# Patient Record
Sex: FEMALE | Race: WHITE | Employment: FULL TIME | URBAN - METROPOLITAN AREA
[De-identification: names, ages, dates, MRNs, and addresses within clinical notes are randomized per-mention and may not be internally consistent; named-entity substitution may affect disease eponyms.]

---
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
## 2020-04-10 ENCOUNTER — OFFICE VISIT (OUTPATIENT)
Dept: NEUROLOGY | Age: 20
End: 2020-04-10

## 2020-04-10 VITALS
HEIGHT: 63 IN | WEIGHT: 127 LBS | DIASTOLIC BLOOD PRESSURE: 72 MMHG | SYSTOLIC BLOOD PRESSURE: 102 MMHG | HEART RATE: 81 BPM | TEMPERATURE: 98 F | BODY MASS INDEX: 22.5 KG/M2 | OXYGEN SATURATION: 98 %

## 2020-04-10 DIAGNOSIS — R20.2 TINGLING SENSATION IN FACE: Primary | ICD-10-CM

## 2020-04-10 DIAGNOSIS — H93.12 TINNITUS, LEFT: ICD-10-CM

## 2020-04-10 RX ORDER — CITALOPRAM 10 MG/1
1 TABLET ORAL DAILY
COMMUNITY
Start: 2020-03-03

## 2020-04-10 RX ORDER — LEVONORGESTREL 19.5 MG/1
INTRAUTERINE DEVICE INTRAUTERINE
COMMUNITY
Start: 2019-06-20

## 2020-04-10 NOTE — PROGRESS NOTES
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 who presents to discuss Migraine    Describes that she started having episodes in high school around 10 yo where she would wake up in the morning, and would have a specific feeling like something was coming on, felt nauseated, would generally eat something, frequently vomit, then would sleep for about 2 hours, and wake up feeling fine. None of the episodes were associated with headache. Episodes never happened when she was anxious. Stopped having the stomach episodes when she was around 22 yo. Started getting monthly migraine (moderate throbbing pain over left eyebrow becoming whole left side head throbbing, infrequent nausea/ rare vomiting, significant light and sound sensitivity, lasting 12-24+ hours) headaches (near menstrual periods) while she was on oral contraceptives. Swtched from [facility] to IUD 10 months ago. Stopped having migraine with her periods. Pt says she started taking Citalopram 10 mg/ day in Aug 2019 for anxiety. Around that time (6-8 months ago), she started having episodes of pins-needles feeling on left side of scalp, a/w transient reduction in hearing on left side followed by ringing in that ear for 1-2 minutes then normal hearing, vision feels mildly blurred, associated with some moderate throbbing pain over left eyebrow. Episodes occur 5-6 times a week or even multiple times a day. No change in alertness during these episodes. No personal or FHx of seizure that she's aware of. Has not had any CT Head or MRI Brain or EEG pertaining to this issue.      Complete Review of Systems: + anxiety, fatigue, frequent headaches (see above), tinnitus, visual disturbance; otherwise as noted in HPI     PMHx:  Past Medical History:   Diagnosis Date    Anxiety disorder     Headache         PSH:  Past Surgical History:   Procedure Laterality Date    HX HEENT      tonsils       FHx:  family history includes Migraines in her maternal grandmother, mother, and sister. SHx:  reports that she has never smoked. She has never used smokeless tobacco.    Allergies:   No Known Allergies    Meds:     Current Outpatient Medications   Medication Sig    citalopram (CELEXA) 10 mg tablet Take 1 Tab by mouth daily.  levonorgestreL (Kyleena) 17.5 mcg/24 hrs (5 yrs) 19.5 mg IUD IUD Kyleena 17.5 mcg/24 hrs (5yrs) 19.5mg intrauterine device   Take by intrauterine route.  HYDROXYZINE HCL PO Take  by mouth.  levonorgestrel-ethinyl estradiol (AUBRA) 0.1-20 mg-mcg tab Take 1 Tab by mouth daily.  ondansetron (ZOFRAN ODT) 4 mg disintegrating tablet Take 4 mg by mouth every eight (8) hours as needed for Nausea.  HYDROcodone-acetaminophen (HYCET) 0.5-21.7 mg/mL oral solution Take 15-30 mL by mouth four (4) times daily as needed for Pain.  amoxicillin (AMOXIL) 400 mg/5 mL suspension Take  by mouth two (2) times a day. PHYSICAL EXAM    Vitals:    04/10/20 0743   BP: 102/72   Pulse: 81   Temp: 98 °F (36.7 °C)   SpO2: 98%   Weight: 57.6 kg (127 lb)   Height: 5' 3\" (1.6 m)       General:  Alert, cooperative, NAD   Head:  Normocephalic, atraumatic. Eyes:  Conjunctivae/corneas clear   Lungs:  Heart:  Not examined; breathing pattern appears normal  Not examined, not relevant    Extremities: No edema.    Skin: No rashes    Neurologic Exam       Language: normal  Memory:  Alert, oriented to person, place, situation    Cranial Nerves:  I: smell Not tested   II: visual fields Full to confrontation   II: pupils Equal, round, reactive to light   II: optic disc Not examined due to proximity concerns in setting of coronavirus pandemic   III,VII: ptosis none   III,IV,VI: extraocular muscles  normal   V: facial light touch sensation  normal   VII: facial muscle function  symmetric   VIII: hearing symmetric   IX: soft palate elevation  normal   XI: sternocleidomastoid strength 5/5   XII: tongue  midline      Motor: normal bulk, tone, strength in all exts  Sensory: intact to LT, PP, temp, vibration x 4 exts   Cerebellar: no rest, postural, or intention tremor  Normal FNF and H-Shin bilaterally  Reflexes: 2+ throughout  Plantar response: neutral bilaterally    Gait: normal gait including tandem  Romberg negative     ASSESSMENT AND PLAN      ICD-10-CM ICD-9-CM    1. Tingling sensation in face R20.2 782.0 NEURO EEG 24 HR      MRI BRAIN W WO CONT   2. Tinnitus, left H93.12 388.30 NEURO EEG 24 HR      MRI BRAIN W WO CONT     23 y.o. female with new, recurring episodes since August 2019, consisting of pins-needles feeling on left side of scalp, a/w transient reduction in hearing on left side followed by ringing in that ear for 1-2 minutes then normal hearing, vision feels mildly blurred, associated with some moderate throbbing pain over left eyebrow, no change or alteration in alertness during these spells. Happening multiple times a week, sometimes multiple times a day. Started around the time she started taking low-dose Citalopram.     D/w patient that due to the stereotypical pattern of her spells, I don't think they are a side effect of the Citalopram.  I agree with Dr Xavier Kruger that these are more likely complex migraines, but I would like to get an EEG to record these episodes (since they can happen multiple times a day) and rule out partial seizure. Will check 24 hour ambulatory EEG and MRI Brain +/- contrast (to evaluate for underlying structural abnormalities in setting of these new-onset episodes) when routine testing opens up again (tentatively early June). D/w patient pros/ cons of starting a medication at this time to suppress the episodes.   She prefers not to add a medication at this point and I agree with that as it could affect the testing results. She is agreeable to the above workup plan. Thank you for allowing me to be part of Maisha Ordonez's care. Please contact the Neurology office at [phone] if you have any questions regarding her evaluation.       Signed By: Patel Kuo MD     April 10, 2020

## 2020-06-01 DIAGNOSIS — H93.12 TINNITUS, LEFT: ICD-10-CM

## 2020-06-01 DIAGNOSIS — R20.2 TINGLING SENSATION IN FACE: ICD-10-CM

## 2020-06-22 ENCOUNTER — HOSPITAL ENCOUNTER (OUTPATIENT)
Dept: MRI IMAGING | Age: 20
Discharge: HOME | End: 2020-06-22
Attending: PSYCHIATRY & NEUROLOGY
Payer: COMMERCIAL

## 2020-06-22 ENCOUNTER — HOSPITAL ENCOUNTER (OUTPATIENT)
Dept: NEUROLOGY | Age: 20
Discharge: HOME | End: 2020-06-22
Attending: PSYCHIATRY & NEUROLOGY
Payer: COMMERCIAL

## 2020-06-22 VITALS — WEIGHT: 130 LBS | BODY MASS INDEX: 23.03 KG/M2

## 2020-06-22 DIAGNOSIS — R20.2 TINGLING SENSATION: ICD-10-CM

## 2020-06-22 DIAGNOSIS — H93.12 TINNITUS, LEFT: ICD-10-CM

## 2020-06-22 DIAGNOSIS — R20.2 TINGLING SENSATION IN FACE: ICD-10-CM

## 2020-06-22 PROCEDURE — 95714 VEEG EA 12-26 HR UNMNTR: CPT

## 2020-06-22 PROCEDURE — A9575 INJ GADOTERATE MEGLUMI 0.1ML: HCPCS | Performed by: RADIOLOGY

## 2020-06-22 PROCEDURE — 74011250636 HC RX REV CODE- 250/636: Performed by: RADIOLOGY

## 2020-06-22 PROCEDURE — 70553 MRI BRAIN STEM W/O & W/DYE: CPT

## 2020-06-22 RX ORDER — GADOTERATE MEGLUMINE 376.9 MG/ML
10 INJECTION INTRAVENOUS
Status: COMPLETED | OUTPATIENT
Start: 2020-06-22 | End: 2020-06-22

## 2020-06-22 RX ADMIN — GADOTERATE MEGLUMINE 10 ML: 376.9 INJECTION INTRAVENOUS at 12:01

## 2020-07-13 ENCOUNTER — DOCUMENTATION ONLY (OUTPATIENT)
Dept: NEUROLOGY | Age: 20
End: 2020-07-13

## 2020-07-13 ENCOUNTER — VIRTUAL VISIT (OUTPATIENT)
Dept: NEUROLOGY | Age: 20
End: 2020-07-13

## 2020-07-13 DIAGNOSIS — G43.109 COMPLICATED MIGRAINE: Primary | ICD-10-CM

## 2020-07-13 NOTE — PROGRESS NOTES
Garth Live is a 23 y.o. female who was seen by synchronous (real-time) audio-video technology on 7/13/2020. Consent: Garth Live, who was seen by synchronous (real-time) audio-video technology, and/or her healthcare decision maker, is aware that this patient-initiated, Telehealth encounter on 7/13/2020 is a billable service, with coverage as determined by her insurance carrier. She is aware that she may receive a bill and has provided verbal consent to proceed: Yes. Assessment & Plan:       ICD-10-CM ICD-9-CM    1. Complicated migraine  H09.886 066.51        Complicated migraine, gradually reduced in intensity and duration. Pt says episodes are not long enough or painful enough for her to want to take a prescription headache pain reliever, and doesn't want to start a daily headahce preventive medication. She is reassured that her MRI Brain and EEG were normal.  She will schedule follow up visit if her headaches worsen or for any other neurologic symptoms. Subjective:     Garth Live is a 23 y.o. female who was seen for Results    MRI Brain with and without contrast: reviewed images and report. Normal study. 24 hour Ambulatory EEG: Patient reports she had two of her typical, brief episodes of tingling on left side of scalp and brief pain. I reviewed entirety of EEG recording and it was normal.     Reports that she has episodes few times a week, throbbing pain above left eyebrow, sometimes has numbness/ tingling on scalp, sometimes gets ringing in ear for few seconds. That whole thing might last 2 minutes. Occurs 2-4 days a week, might happen 1-4 times in a day. 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 who presents to discuss Migraine.      Describes that she started having episodes in high school around 10 yo where she would wake up in the morning, and would have a specific feeling like something was coming on, felt nauseated, would generally eat something, frequently vomit, then would sleep for about 2 hours, and wake up feeling fine. None of the episodes were associated with headache. Episodes never happened when she was anxious. Stopped having the stomach episodes when she was around 24 yo. Started getting monthly migraine (moderate throbbing pain over left eyebrow becoming whole left side head throbbing, infrequent nausea/ rare vomiting, significant light and sound sensitivity, lasting 12-24+ hours) headaches (near menstrual periods) while she was on oral contraceptives. Swtched from [facility] to IUD 10 months ago. Stopped having migraine with her periods.       Pt says she started taking Citalopram 10 mg/ day in Aug 2019 for anxiety. Around that time (6-8 months ago), she started having episodes of pins-needles feeling on left side of scalp, a/w transient reduction in hearing on left side followed by ringing in that ear for 1-2 minutes then normal hearing, vision feels mildly blurred, associated with some moderate throbbing pain over left eyebrow. Episodes occur 5-6 times a week or even multiple times a day. No change in alertness during these episodes. No personal or FHx of seizure that she's aware of. Has not had any CT Head or MRI Brain or EEG pertaining to this issue. PMHx:   Past Medical History:   Diagnosis Date    Anxiety disorder     Headache        PSHx:  has a past surgical history that includes hx heent. SocHx:  reports that she has never smoked. She has never used smokeless tobacco.    FHx: family history includes Migraines in her maternal grandmother, mother, and sister. Allergies:   No Known Allergies    Medications:  Current Outpatient Medications   Medication Sig    citalopram (CELEXA) 10 mg tablet Take 1 Tab by mouth daily.     levonorgestreL (Kyleena) 17.5 mcg/24 hrs (5 yrs) 19.5 mg IUD IUD Kyleena 17.5 mcg/24 hrs (5yrs) 19.5mg intrauterine device   Take by intrauterine route.  HYDROXYZINE HCL PO Take  by mouth.  levonorgestrel-ethinyl estradiol (AUBRA) 0.1-20 mg-mcg tab Take 1 Tab by mouth daily.  ondansetron (ZOFRAN ODT) 4 mg disintegrating tablet Take 4 mg by mouth every eight (8) hours as needed for Nausea.  HYDROcodone-acetaminophen (HYCET) 0.5-21.7 mg/mL oral solution Take 15-30 mL by mouth four (4) times daily as needed for Pain.  amoxicillin (AMOXIL) 400 mg/5 mL suspension Take  by mouth two (2) times a day. Objective:   Vital Signs: (As obtained by patient/caregiver at home)  There were no vitals taken for this visit.      [INSTRUCTIONS:  \"[x]\" Indicates a positive item  \"[]\" Indicates a negative item  -- DELETE ALL ITEMS NOT EXAMINED]    Constitutional: [x] Appears well-developed and well-nourished [x] No apparent distress      [] Abnormal -     Mental status: [x] Alert and awake  [x] Oriented to person/place/time [x] Able to follow commands    [] Abnormal -     Eyes:   EOM    [x]  Normal    [] Abnormal -   Sclera  [x]  Normal    [] Abnormal -          Discharge [x]  None visible   [] Abnormal -     HENT: [x] Normocephalic, atraumatic  [] Abnormal -   [x] Mouth/Throat: Mucous membranes are moist    External Ears [x] Normal  [] Abnormal -    Neck: [x] No visualized mass [] Abnormal -     Pulmonary/Chest: [x] Respiratory effort normal   [x] No visualized signs of difficulty breathing or respiratory distress        [] Abnormal -      Musculoskeletal:   [x] Normal gait with no signs of ataxia         [x] Normal range of motion of neck        [] Abnormal -     Neurological:        [x] No Facial Asymmetry (Cranial nerve 7 motor function) (limited exam due to video visit)          [x] No gaze palsy        [] Abnormal -          Skin:        [x] No significant exanthematous lesions or discoloration noted on facial skin         [] Abnormal -            Psychiatric:       [x] Normal Affect [] Abnormal -        [x] No Hallucinations    Other pertinent observable physical exam findings:-      We discussed the expected course, resolution and complications of the diagnosis(es) in detail. Medication risks, benefits, costs, interactions, and alternatives were discussed as indicated. I advised her to contact the office if her condition worsens, changes or fails to improve as anticipated. She expressed understanding with the diagnosis(es) and plan. Barrie Carvalho is a 23 y.o. female who was evaluated by a video visit encounter for concerns as above. Patient identification was verified prior to start of the visit. A caregiver was present when appropriate. Due to this being a TeleHealth encounter (During [facility] public [facility] emergency), evaluation of the following organ systems was limited: Vitals/Constitutional/EENT/Resp/CV/GI//MS/Neuro/Skin/Heme-Lymph-Imm. Pursuant to the emergency declaration under the [facility], 1135 waiver authority and the [employer] Act, this Virtual  Visit was conducted, with patient's (and/or legal guardian's) consent, to reduce the patient's risk of exposure to COVID-19 and provide necessary medical care. Services were provided through a video synchronous discussion virtually to substitute for in-person clinic visit. Patient and provider were located at their individual homes.       Nik Crisostomo MD

## 2020-07-13 NOTE — PROCEDURES
Ambulatory EEG Monitoring Report            [facility]      [place], [address]               Name:    Emely Vaz     Age:     23 y.o. Sex:     female  MRN:   [id number]       Start date:  6-/ 101 PM      End date:   6-/ 1003 AM    Requesting Provider:  Reno Matos MD  Interpreting Phyician:  Reno Matos MD    Introduction: 23 y.o. female with new, recurring episodes since August 2019, consisting of pins-needles feeling on left side of scalp, a/w transient reduction in hearing on left side followed by ringing in that ear for 1-2 minutes then normal hearing, vision feels mildly blurred, associated with some moderate throbbing pain over left eyebrow, no change or alteration in alertness during these spells. Happening multiple times a week, sometimes multiple times a day. Started around the time she started taking low-dose Citalopram.     Technical:   Digital EEG recorded in 10-20 international placement system, multiple montages    Interpretation: During wakefulness, there are periods of moderate myogenic artifact but it doesn't exclude the background pattern when patient is resting, relaxed. The background is symmetric, medium amplitude, organized. The Posterior Dominant Rhythm is 10-11 hz, symmetric. Drowsiness is noted as the background waxes and wanes in amplitude and frequency. Sleep is noted by the presence of sleep spindles, vertex waves, and K-complexes. No abnormalities were activated by sleep. The single lead EKG did not reveal any abnormalities. Areas of focal slowing: none  Epileptiform discharges: none    Events:  Patient reported having two of her typical, brief episodes of tingling on left side of scalp and brief pain. Entirety of this EEG recording was normal, making those episodes non-epileptic events.         Clinical Correlation: This was a normal 24 hour Ambulatory EEG recording.     Two of the patient's typical spells were recorded and found to be non-epileptic in nature. Clinical correlation is necessary. Elsa Wasserman M.D.   [address] [place] [address] Neurology [facility]

## 2023-05-19 RX ORDER — ONDANSETRON 4 MG/1
4 TABLET, ORALLY DISINTEGRATING ORAL EVERY 8 HOURS PRN
COMMUNITY

## 2023-05-19 RX ORDER — LEVONORGESTREL 19.5 MG/1
INTRAUTERINE DEVICE INTRAUTERINE
COMMUNITY
Start: 2019-06-20

## 2023-05-19 RX ORDER — LEVONORGESTREL AND ETHINYL ESTRADIOL 0.1-0.02MG
1 KIT ORAL DAILY
COMMUNITY

## 2023-05-19 RX ORDER — AMOXICILLIN 400 MG/5ML
POWDER, FOR SUSPENSION ORAL 2 TIMES DAILY
COMMUNITY

## 2023-05-19 RX ORDER — CITALOPRAM 10 MG/1
1 TABLET ORAL DAILY
COMMUNITY
Start: 2020-03-03